# Patient Record
Sex: FEMALE | Race: WHITE | Employment: FULL TIME | URBAN - METROPOLITAN AREA
[De-identification: names, ages, dates, MRNs, and addresses within clinical notes are randomized per-mention and may not be internally consistent; named-entity substitution may affect disease eponyms.]

---

## 2017-03-29 ENCOUNTER — OFFICE VISIT (OUTPATIENT)
Dept: FAMILY MEDICINE CLINIC | Age: 57
End: 2017-03-29

## 2017-03-29 VITALS
SYSTOLIC BLOOD PRESSURE: 148 MMHG | HEIGHT: 63 IN | RESPIRATION RATE: 16 BRPM | DIASTOLIC BLOOD PRESSURE: 88 MMHG | HEART RATE: 72 BPM | WEIGHT: 182.4 LBS | BODY MASS INDEX: 32.32 KG/M2

## 2017-03-29 DIAGNOSIS — R59.9 SWOLLEN LYMPH NODES: Primary | ICD-10-CM

## 2017-03-29 DIAGNOSIS — L71.9 ROSACEA: ICD-10-CM

## 2017-03-29 DIAGNOSIS — L21.9 SEBORRHEIC DERMATITIS OF SCALP: ICD-10-CM

## 2017-03-29 PROCEDURE — 96372 THER/PROPH/DIAG INJ SC/IM: CPT | Performed by: FAMILY MEDICINE

## 2017-03-29 PROCEDURE — 99213 OFFICE O/P EST LOW 20 MIN: CPT | Performed by: FAMILY MEDICINE

## 2017-03-29 RX ORDER — METHYLPREDNISOLONE ACETATE 80 MG/ML
80 INJECTION, SUSPENSION INTRA-ARTICULAR; INTRALESIONAL; INTRAMUSCULAR; SOFT TISSUE ONCE
Status: COMPLETED | OUTPATIENT
Start: 2017-03-29 | End: 2017-03-29

## 2017-03-29 RX ORDER — METRONIDAZOLE 7.5 MG/G
GEL TOPICAL
Qty: 15 G | Refills: 5 | Status: SHIPPED | OUTPATIENT
Start: 2017-03-29 | End: 2017-09-28 | Stop reason: ALTCHOICE

## 2017-03-29 RX ORDER — METHYLPREDNISOLONE ACETATE 40 MG/ML
40 INJECTION, SUSPENSION INTRA-ARTICULAR; INTRALESIONAL; INTRAMUSCULAR; SOFT TISSUE ONCE
Status: COMPLETED | OUTPATIENT
Start: 2017-03-29 | End: 2017-03-29

## 2017-03-29 RX ORDER — BETAMETHASONE VALERATE 1.2 MG/G
AEROSOL, FOAM TOPICAL DAILY
Qty: 1 CAN | Refills: 5 | Status: SHIPPED | OUTPATIENT
Start: 2017-03-29 | End: 2017-09-28 | Stop reason: ALTCHOICE

## 2017-03-29 RX ADMIN — METHYLPREDNISOLONE ACETATE 40 MG: 40 INJECTION, SUSPENSION INTRA-ARTICULAR; INTRALESIONAL; INTRAMUSCULAR; SOFT TISSUE at 08:59

## 2017-03-29 RX ADMIN — METHYLPREDNISOLONE ACETATE 80 MG: 80 INJECTION, SUSPENSION INTRA-ARTICULAR; INTRALESIONAL; INTRAMUSCULAR; SOFT TISSUE at 08:59

## 2017-03-29 ASSESSMENT — ENCOUNTER SYMPTOMS
GASTROINTESTINAL NEGATIVE: 1
RESPIRATORY NEGATIVE: 1

## 2017-03-29 ASSESSMENT — PATIENT HEALTH QUESTIONNAIRE - PHQ9
1. LITTLE INTEREST OR PLEASURE IN DOING THINGS: 0
2. FEELING DOWN, DEPRESSED OR HOPELESS: 0
SUM OF ALL RESPONSES TO PHQ QUESTIONS 1-9: 0
SUM OF ALL RESPONSES TO PHQ9 QUESTIONS 1 & 2: 0

## 2017-04-26 ENCOUNTER — OFFICE VISIT (OUTPATIENT)
Dept: FAMILY MEDICINE CLINIC | Age: 57
End: 2017-04-26

## 2017-04-26 VITALS
BODY MASS INDEX: 31.75 KG/M2 | RESPIRATION RATE: 12 BRPM | HEART RATE: 64 BPM | SYSTOLIC BLOOD PRESSURE: 118 MMHG | HEIGHT: 63 IN | DIASTOLIC BLOOD PRESSURE: 72 MMHG | WEIGHT: 179.2 LBS

## 2017-04-26 DIAGNOSIS — L71.9 ROSACEA: ICD-10-CM

## 2017-04-26 DIAGNOSIS — L21.9 SEBORRHEIC DERMATITIS OF SCALP: Primary | ICD-10-CM

## 2017-04-26 DIAGNOSIS — K21.9 GASTROESOPHAGEAL REFLUX DISEASE, ESOPHAGITIS PRESENCE NOT SPECIFIED: ICD-10-CM

## 2017-04-26 DIAGNOSIS — R73.01 IFG (IMPAIRED FASTING GLUCOSE): ICD-10-CM

## 2017-04-26 DIAGNOSIS — Z82.49 FAMILY HISTORY OF EARLY CAD: ICD-10-CM

## 2017-04-26 DIAGNOSIS — E78.5 HYPERLIPIDEMIA, UNSPECIFIED HYPERLIPIDEMIA TYPE: ICD-10-CM

## 2017-04-26 PROCEDURE — 96372 THER/PROPH/DIAG INJ SC/IM: CPT | Performed by: FAMILY MEDICINE

## 2017-04-26 PROCEDURE — 99214 OFFICE O/P EST MOD 30 MIN: CPT | Performed by: FAMILY MEDICINE

## 2017-04-26 RX ORDER — METHYLPREDNISOLONE ACETATE 40 MG/ML
40 INJECTION, SUSPENSION INTRA-ARTICULAR; INTRALESIONAL; INTRAMUSCULAR; SOFT TISSUE ONCE
Status: COMPLETED | OUTPATIENT
Start: 2017-04-26 | End: 2017-04-26

## 2017-04-26 RX ORDER — METHYLPREDNISOLONE ACETATE 80 MG/ML
80 INJECTION, SUSPENSION INTRA-ARTICULAR; INTRALESIONAL; INTRAMUSCULAR; SOFT TISSUE ONCE
Status: COMPLETED | OUTPATIENT
Start: 2017-04-26 | End: 2017-04-26

## 2017-04-26 RX ADMIN — METHYLPREDNISOLONE ACETATE 80 MG: 80 INJECTION, SUSPENSION INTRA-ARTICULAR; INTRALESIONAL; INTRAMUSCULAR; SOFT TISSUE at 08:31

## 2017-04-26 RX ADMIN — METHYLPREDNISOLONE ACETATE 40 MG: 40 INJECTION, SUSPENSION INTRA-ARTICULAR; INTRALESIONAL; INTRAMUSCULAR; SOFT TISSUE at 08:31

## 2017-04-26 ASSESSMENT — ENCOUNTER SYMPTOMS
RESPIRATORY NEGATIVE: 1
GASTROINTESTINAL NEGATIVE: 1

## 2017-06-24 LAB
ALBUMIN SERPL-MCNC: 4.3 G/DL (ref 3.2–5.3)
ALK PHOSPHATASE: 48 IU/L (ref 35–121)
ALT SERPL-CCNC: 20 IU/L (ref 5–59)
ANION GAP SERPL CALCULATED.3IONS-SCNC: 10 MMOL/L
AST SERPL-CCNC: 17 IU/L (ref 10–42)
BILIRUB SERPL-MCNC: 0.8 MG/DL (ref 0.2–1.3)
BUN BLDV-MCNC: 20 MG/DL (ref 10–20)
CALCIUM SERPL-MCNC: 9.5 MG/DL (ref 8.7–10.8)
CHLORIDE BLD-SCNC: 105 MMOL/L (ref 95–111)
CHOLESTEROL/HDL RATIO: 3.7
CHOLESTEROL: 239 MG/DL
CO2: 28 MMOL/L (ref 21–32)
CREAT SERPL-MCNC: 0.8 MG/DL (ref 0.5–1.3)
EGFR AFRICAN AMERICAN: 89
EGFR IF NONAFRICAN AMERICAN: 74
GLUCOSE: 94 MG/DL (ref 70–100)
HDLC SERPL-MCNC: 64 MG/DL (ref 40–60)
LDL CHOLESTEROL CALCULATED: 159 MG/DL
LDL/HDL RATIO: 2.5
POTASSIUM SERPL-SCNC: 4.2 MMOL/L (ref 3.5–5.4)
SODIUM BLD-SCNC: 139 MMOL/L (ref 134–147)
TOTAL PROTEIN: 7.3 G/DL (ref 5.8–8)
TRIGL SERPL-MCNC: 82 MG/DL
VLDLC SERPL CALC-MCNC: 16 MG/DL

## 2017-06-26 LAB
AVERAGE GLUCOSE: 123 MG/DL (ref 66–114)
HBA1C MFR BLD: 5.9 % (ref 4.2–5.8)
TSH SERPL DL<=0.05 MIU/L-ACNC: 2.16 UIU/ML (ref 0.4–4.4)

## 2017-09-28 ENCOUNTER — OFFICE VISIT (OUTPATIENT)
Dept: FAMILY MEDICINE CLINIC | Age: 57
End: 2017-09-28
Payer: COMMERCIAL

## 2017-09-28 VITALS
DIASTOLIC BLOOD PRESSURE: 82 MMHG | RESPIRATION RATE: 12 BRPM | BODY MASS INDEX: 30.79 KG/M2 | WEIGHT: 173.8 LBS | HEIGHT: 63 IN | TEMPERATURE: 97.6 F | HEART RATE: 60 BPM | SYSTOLIC BLOOD PRESSURE: 136 MMHG

## 2017-09-28 DIAGNOSIS — H00.19 CHALAZION, UNSPECIFIED LATERALITY: Primary | ICD-10-CM

## 2017-09-28 PROCEDURE — 99213 OFFICE O/P EST LOW 20 MIN: CPT | Performed by: FAMILY MEDICINE

## 2017-09-28 ASSESSMENT — ENCOUNTER SYMPTOMS
GASTROINTESTINAL NEGATIVE: 1
RESPIRATORY NEGATIVE: 1

## 2018-01-05 ENCOUNTER — HOSPITAL ENCOUNTER (OUTPATIENT)
Dept: WOMENS IMAGING | Age: 58
Discharge: HOME | End: 2018-01-05
Payer: COMMERCIAL

## 2018-01-05 DIAGNOSIS — Z12.31 VISIT FOR SCREENING MAMMOGRAM: ICD-10-CM

## 2018-01-05 PROCEDURE — 77063 BREAST TOMOSYNTHESIS BI: CPT

## 2019-02-12 ENCOUNTER — HOSPITAL ENCOUNTER (OUTPATIENT)
Dept: WOMENS IMAGING | Age: 59
Discharge: HOME | End: 2019-02-12
Payer: COMMERCIAL

## 2019-02-12 DIAGNOSIS — Z12.39 SCREENING BREAST EXAMINATION: ICD-10-CM

## 2019-02-12 PROCEDURE — 77063 BREAST TOMOSYNTHESIS BI: CPT

## 2019-06-18 ENCOUNTER — OFFICE VISIT (OUTPATIENT)
Dept: FAMILY MEDICINE CLINIC | Age: 59
End: 2019-06-18
Payer: COMMERCIAL

## 2019-06-18 VITALS
HEART RATE: 68 BPM | TEMPERATURE: 97.6 F | RESPIRATION RATE: 13 BRPM | SYSTOLIC BLOOD PRESSURE: 122 MMHG | DIASTOLIC BLOOD PRESSURE: 78 MMHG | HEIGHT: 63 IN | BODY MASS INDEX: 31.29 KG/M2 | WEIGHT: 176.6 LBS | OXYGEN SATURATION: 98 %

## 2019-06-18 DIAGNOSIS — Z91.09 ENVIRONMENTAL ALLERGIES: ICD-10-CM

## 2019-06-18 DIAGNOSIS — J34.2 DNS (DEVIATED NASAL SEPTUM): ICD-10-CM

## 2019-06-18 DIAGNOSIS — H69.83 ETD (EUSTACHIAN TUBE DYSFUNCTION), BILATERAL: Primary | ICD-10-CM

## 2019-06-18 PROCEDURE — 96372 THER/PROPH/DIAG INJ SC/IM: CPT | Performed by: FAMILY MEDICINE

## 2019-06-18 PROCEDURE — 99213 OFFICE O/P EST LOW 20 MIN: CPT | Performed by: FAMILY MEDICINE

## 2019-06-18 RX ORDER — METHYLPREDNISOLONE ACETATE 40 MG/ML
40 INJECTION, SUSPENSION INTRA-ARTICULAR; INTRALESIONAL; INTRAMUSCULAR; SOFT TISSUE ONCE
Status: COMPLETED | OUTPATIENT
Start: 2019-06-18 | End: 2019-06-18

## 2019-06-18 RX ORDER — METHYLPREDNISOLONE ACETATE 80 MG/ML
80 INJECTION, SUSPENSION INTRA-ARTICULAR; INTRALESIONAL; INTRAMUSCULAR; SOFT TISSUE ONCE
Status: COMPLETED | OUTPATIENT
Start: 2019-06-18 | End: 2019-06-18

## 2019-06-18 RX ADMIN — METHYLPREDNISOLONE ACETATE 80 MG: 80 INJECTION, SUSPENSION INTRA-ARTICULAR; INTRALESIONAL; INTRAMUSCULAR; SOFT TISSUE at 08:46

## 2019-06-18 RX ADMIN — METHYLPREDNISOLONE ACETATE 40 MG: 40 INJECTION, SUSPENSION INTRA-ARTICULAR; INTRALESIONAL; INTRAMUSCULAR; SOFT TISSUE at 08:45

## 2019-06-18 ASSESSMENT — PATIENT HEALTH QUESTIONNAIRE - PHQ9
SUM OF ALL RESPONSES TO PHQ9 QUESTIONS 1 & 2: 0
2. FEELING DOWN, DEPRESSED OR HOPELESS: 0
1. LITTLE INTEREST OR PLEASURE IN DOING THINGS: 0
SUM OF ALL RESPONSES TO PHQ QUESTIONS 1-9: 0
SUM OF ALL RESPONSES TO PHQ QUESTIONS 1-9: 0

## 2019-06-18 ASSESSMENT — ENCOUNTER SYMPTOMS
GASTROINTESTINAL NEGATIVE: 1
RESPIRATORY NEGATIVE: 1
SINUS PRESSURE: 1

## 2019-06-18 NOTE — PATIENT INSTRUCTIONS
You may receive a survey regarding the care you received during your visit. Your input is valuable to us. We encourage you to complete and return your survey. We hope you will choose us in the future for your healthcare needs. Patient Education        Eustachian Tube Problems: Care Instructions  Your Care Instructions    The eustachian (say \"you-STAY-shee-un\") tubes run between the inside of the ears and the throat. They keep air pressure stable in the ears. If your eustachian tubes become blocked, the air pressure in your ears changes. The fluids from a cold can clog eustachian tubes, causing pain in the ears. A quick change in air pressure can cause eustachian tubes to close up. This might happen when an airplane changes altitude or when a  goes up or down underwater. Eustachian tube problems often clear up on their own or after antibiotic treatment. If your tubes continue to be blocked, you may need surgery. Follow-up care is a key part of your treatment and safety. Be sure to make and go to all appointments, and call your doctor if you are having problems. It's also a good idea to know your test results and keep a list of the medicines you take. How can you care for yourself at home? · To ease ear pain, apply a warm washcloth or a heating pad set on low. There may be some drainage from the ear when the heat melts earwax. Put a cloth between the heat source and your skin. Do not use a heating pad with children. · If your doctor prescribed antibiotics, take them as directed. Do not stop taking them just because you feel better. You need to take the full course of antibiotics. · Your doctor may recommend over-the-counter medicine. Be safe with medicines. Oral or nasal decongestants may relieve ear pain. Avoid decongestants that are combined with antihistamines, which tend to cause more blockage. But if allergies seem to be the problem, your doctor may recommend a combination.  Be careful with cough and cold medicines. Don't give them to children younger than 6, because they don't work for children that age and can even be harmful. For children 6 and older, always follow all the instructions carefully. Make sure you know how much medicine to give and how long to use it. And use the dosing device if one is included. When should you call for help? Call your doctor now or seek immediate medical care if:    · You develop sudden, complete hearing loss.     · You have severe pain or feel dizzy.     · You have new or increasing pus or blood draining from your ear.     · You have redness, swelling, or pain around or behind the ear.    Watch closely for changes in your health, and be sure to contact your doctor if:    · You do not get better after 2 weeks.     · You have any new symptoms, such as itching or a feeling of fullness in the ear. Where can you learn more? Go to https://[employer]ome.Nubisio. org and sign in to your [employer] account. Enter Y822 in the [employer] box to learn more about \"Eustachian Tube Problems: Care Instructions. \"     If you do not have an account, please click on the \"Sign Up Now\" link. Current as of: October 21, 2018  Content Version: 12.0  © 5973-9645 Healthwise, Incorporated. Care instructions adapted under license by [facility] ([facility]). If you have questions about a medical condition or this instruction, always ask your healthcare professional. Brissa Dollar any warranty or liability for your use of this information.        OTC Allegra-D 24 hr and Nasacort NS

## 2019-06-18 NOTE — PROGRESS NOTES
Visit Information    Have you changed or started any medications since your last visit including any over-the-counter medicines, vitamins, or herbal medicines? no   Are you having any side effects from any of your medications? -  no  Have you stopped taking any of your medications? Is so, why? -  no    Have you seen any other physician or provider since your last visit? No  Have you had any other diagnostic tests since your last visit? No  Have you been seen in the emergency room and/or had an admission to a hospital since we last saw you? No  Have you had your routine dental cleaning in the past 6 months? na    Have you activated your [employer] account? If not, what are your barriers? No: declined      Patient Care Team:  Valerie Mishra DO as PCP - General (Family Medicine)  Valerie Mishra DO as PCP - [facility]    Medical History Review  Past Medical, Family, and Social History reviewed and does not contribute to the patient presenting condition    Health Maintenance   Topic Date Due    Hepatitis C screen  1960    HIV screen  12/21/1973    DTaP/Tdap/Td vaccine (1 - Tdap) 12/21/1977    Shingles Vaccine (1 of 2) 12/21/2008    A1C test (Diabetic or Prediabetic)  06/24/2018    Flu vaccine (Season Ended) 09/01/2019    Breast cancer screen  02/12/2021    Lipid screen  06/24/2022    Colon cancer screen colonoscopy  11/13/2027    Pneumococcal 0-64 years Vaccine  Aged Out       Administrations This Visit     methylPREDNISolone acetate (DEPO-MEDROL) injection 40 mg     Admin Date  06/18/2019  08:45 Action  Given Dose  40 mg Route  Intramuscular Site  Dorsogluteal Right Administered By  Paolo Quinones CMA ([address] [place] [address])    Ordering Provider:  Valerie Mishra DO    NDC:  4733-9418-70    Lot#:  [id number]    :  [id number].     Patient Supplied?:  No    Comments:  Ex date: 9/19          methylPREDNISolone acetate (DEPO-MEDROL) injection 80 mg     Admin Date  06/18/2019  08:46 Action  Given Dose  80 mg Route  Intramuscular Site  Dorsogluteal Right Administered By  Bernabe Bergeron CMA ([address] [place] [address])    Ordering Provider:  Shira Mancuso DO    NDC:  0815-9629-09    Lot#:  [id number]    :  [id number]. Patient Supplied?:  No    Comments:  Ex date: 6/20              Per orders of Dr. Fede Devi, injection of Depo-Medrol 120 mg 2 ML given IM in right dorsogluteal by Can Hirsch ([address] [place] [address]). Patient instructed to report any adverse reaction to me immediately. .  Patient tolerated well.

## 2019-06-18 NOTE — PROGRESS NOTES
Subjective:      Patient ID: Karime Guzman is a 59 y.o. female. HPI:    Chief Complaint   Patient presents with    Ear Fullness     \"hollow\" sounds in ears     Sinus Problem     pressure, nasal congestion tried OTC Advil cold/sinus      Pt here for sinus pressure, nasal congestion and ear fullness for the last few months. No fevers. Taking Advil Cold and Sinus. No allergy meds. Patient Active Problem List   Diagnosis    GERD (gastroesophageal reflux disease)    History of hysterectomy for benign disease    Family history of early CAD    Overactive bladder     Past Surgical History:   Procedure Laterality Date    BLADDER SUSPENSION  2000    sling- Dr Fontanez [facility]  ?????    left and right    HYSTERECTOMY      KNEE ARTHROSCOPY  2009    knee cap    ROTATOR CUFF REPAIR Left 2001     Prior to Admission medications    Medication Sig Start Date End Date Taking? Authorizing Provider   omeprazole (PRILOSEC OTC) 20 MG tablet Take 20 mg by mouth daily. Yes Historical Provider, MD   Ascorbic Acid (VITAMIN C PO) Take 500 mg by mouth daily. Yes Historical Provider, MD       Review of Systems   Constitutional: Negative. HENT: Positive for congestion, hearing loss and sinus pressure. Negative for ear discharge, ear pain and tinnitus. Respiratory: Negative. Cardiovascular: Negative. Gastrointestinal: Negative. Musculoskeletal: Negative. Neurological: Positive for headaches. All other systems reviewed and are negative. Objective:   Physical Exam   Constitutional: She is oriented to person, place, and time. She appears well-developed and well-nourished. HENT:   Head: Normocephalic and atraumatic. Right Ear: Tympanic membrane is scarred. A middle ear effusion is present. Left Ear: A middle ear effusion is present. Nose: Mucosal edema, rhinorrhea and septal deviation present.    Mouth/Throat: Oropharynx is clear and moist and mucous membranes are normal. Cardiovascular: Normal rate, regular rhythm and normal heart sounds. No murmur heard. Pulmonary/Chest: Effort normal and breath sounds normal.   Abdominal: Soft. Bowel sounds are normal.   Musculoskeletal: She exhibits no edema. Neurological: She is alert and oriented to person, place, and time. Skin: Skin is warm and dry. Psychiatric: She has a normal mood and affect. Her behavior is normal.   Nursing note and vitals reviewed. Assessment:       Diagnosis Orders   1. ETD (Eustachian tube dysfunction), bilateral     2. Environmental allergies  methylPREDNISolone acetate (DEPO-MEDROL) injection 80 mg    methylPREDNISolone acetate (DEPO-MEDROL) injection 40 mg   3.  DNS (deviated nasal septum)             Plan:      -  Depo 120 mg IM  -  OTC Allegra-D, Nasacort  -  Discussed ENT eval, declines at this time  -  RTO prn        Dwaine Cancer, DO

## 2019-08-21 ENCOUNTER — OFFICE VISIT (OUTPATIENT)
Dept: FAMILY MEDICINE CLINIC | Age: 59
End: 2019-08-21
Payer: COMMERCIAL

## 2019-08-21 VITALS
HEART RATE: 76 BPM | BODY MASS INDEX: 31.9 KG/M2 | WEIGHT: 180.1 LBS | RESPIRATION RATE: 16 BRPM | SYSTOLIC BLOOD PRESSURE: 130 MMHG | DIASTOLIC BLOOD PRESSURE: 70 MMHG

## 2019-08-21 DIAGNOSIS — Z91.09 ENVIRONMENTAL ALLERGIES: ICD-10-CM

## 2019-08-21 DIAGNOSIS — H69.83 ETD (EUSTACHIAN TUBE DYSFUNCTION), BILATERAL: Primary | ICD-10-CM

## 2019-08-21 DIAGNOSIS — J34.2 DNS (DEVIATED NASAL SEPTUM): ICD-10-CM

## 2019-08-21 PROCEDURE — 99213 OFFICE O/P EST LOW 20 MIN: CPT | Performed by: FAMILY MEDICINE

## 2019-08-21 ASSESSMENT — ENCOUNTER SYMPTOMS
RESPIRATORY NEGATIVE: 1
GASTROINTESTINAL NEGATIVE: 1

## 2019-08-21 NOTE — PROGRESS NOTES
Visit Information    Have you changed or started any medications since your last visit including any over-the-counter medicines, vitamins, or herbal medicines? no   Are you having any side effects from any of your medications? -  no  Have you stopped taking any of your medications? Is so, why? -  no    Have you seen any other physician or provider since your last visit? No  Have you had any other diagnostic tests since your last visit? No  Have you been seen in the emergency room and/or had an admission to a hospital since we last saw you? No  Have you had your routine dental cleaning in the past 6 months? yes -      Have you activated your In[employer] account? If not, what are your barriers?  No: declines     Patient Care Team:  Nuzhat Max DO as PCP - General (Family Medicine)  Nuzhat Max DO as PCP - [facility] Provider    Medical History Review  Past Medical, Family, and Social History reviewed and does contribute to the patient presenting condition    Health Maintenance   Topic Date Due    Hepatitis C screen  1960    HIV screen  12/21/1973    DTaP/Tdap/Td vaccine (1 - Tdap) 12/21/1977    Shingles Vaccine (1 of 2) 12/21/2008    A1C test (Diabetic or Prediabetic)  06/24/2018    Flu vaccine (1) 09/01/2019    Breast cancer screen  02/12/2021    Lipid screen  06/24/2022    Colon cancer screen colonoscopy  11/13/2027    Pneumococcal 0-64 years Vaccine  Aged Out

## 2019-09-09 ENCOUNTER — HOSPITAL ENCOUNTER (OUTPATIENT)
Dept: CT IMAGING | Age: 59
Discharge: HOME | End: 2019-09-09
Payer: COMMERCIAL

## 2019-09-09 DIAGNOSIS — J34.2 DNS (DEVIATED NASAL SEPTUM): ICD-10-CM

## 2019-09-09 DIAGNOSIS — H69.83 ETD (EUSTACHIAN TUBE DYSFUNCTION), BILATERAL: ICD-10-CM

## 2019-09-09 PROCEDURE — 70486 CT MAXILLOFACIAL W/O DYE: CPT

## 2019-09-10 DIAGNOSIS — J34.2 DNS (DEVIATED NASAL SEPTUM): ICD-10-CM

## 2019-09-10 DIAGNOSIS — H69.83 ETD (EUSTACHIAN TUBE DYSFUNCTION), BILATERAL: Primary | ICD-10-CM

## 2019-10-18 ENCOUNTER — HOSPITAL ENCOUNTER (OUTPATIENT)
Age: 59
Discharge: HOME | End: 2019-10-18
Payer: COMMERCIAL

## 2019-10-18 PROCEDURE — 36415 COLL VENOUS BLD VENIPUNCTURE: CPT

## 2019-10-18 PROCEDURE — 86003 ALLG SPEC IGE CRUDE XTRC EA: CPT

## 2019-10-18 PROCEDURE — 86235 NUCLEAR ANTIGEN ANTIBODY: CPT

## 2019-10-20 LAB
ANTI SSA: NORMAL
ANTI SSB: 0 AU/ML (ref 0–40)

## 2019-10-22 LAB
ALLERGEN BARLEY IGE: < 0.1 KU/L
ALLERGEN BEEF: < 0.1 KU/L
ALLERGEN BERMUDA GRASS IGE: < 0.1 KU/L
ALLERGEN BIRCH IGE: < 0.1 KU/L
ALLERGEN BOX ELDER: < 0.1 KU/L
ALLERGEN CABBAGE IGE: < 0.1 KU/L
ALLERGEN CARROT IGE: < 0.1 KU/L
ALLERGEN CAT DANDER IGE: < 0.1 KU/L
ALLERGEN CHICKEN IGE: < 0.1 KU/L
ALLERGEN CODFISH IGE: < 0.1 KU/L
ALLERGEN COMMON SHORT RAGWEED IGE: < 0.1 KU/L
ALLERGEN CORN IGE: < 0.1 KU/L
ALLERGEN COTTONWOOD: < 0.1 KU/L
ALLERGEN CRAB IGE: < 0.1 KU/L
ALLERGEN D. FARINAE (DUST MITE): < 0.1 KU/L
ALLERGEN DOG DANDER IGE: < 0.1 KU/L
ALLERGEN EGG WHITE IGE: < 0.1 KU/L
ALLERGEN ELM IGE: < 0.1 KU/L
ALLERGEN FUNGI/MOLD A. ALTERNATA IGE: < 0.1 KU/L
ALLERGEN FUNGI/MOLD A. FUMIGATUS IGE: < 0.1 KU/L
ALLERGEN FUNGI/MOLD M.RACEMOSUS IGE: < 0.1 KU/L
ALLERGEN FUNGI/MOLD P. NOTATUM IGE: < 0.1 KU/L
ALLERGEN GERMAN COCKROACH IGE: < 0.1 KU/L
ALLERGEN GRAPE IGE: < 0.1 KU/L
ALLERGEN INTERPRETATION/SCORE: NORMAL
ALLERGEN LETTUCE IGE: < 0.1 KU/L
ALLERGEN MILK IGE: < 0.1 KU/L
ALLERGEN MITE DUST PTERONYSSINUS IGE: < 0.1 KU/L
ALLERGEN MOUNTAIN CEDAR: < 0.1 KU/L
ALLERGEN MOUSE EPITHELIA IGE: < 0.1 KU/L
ALLERGEN NAVY BEAN: < 0.1 KU/L
ALLERGEN OAT: < 0.1 KU/L
ALLERGEN ORANGE IGE: < 0.1 KU/L
ALLERGEN PECAN TREE IGE: < 0.1 KU/L
ALLERGEN PEPPER C. ANNUUM IGE: < 0.1 KU/L
ALLERGEN PORK: < 0.1 KU/L
ALLERGEN POTATO IGE: < 0.1 KU/L
ALLERGEN RICE IGE: < 0.1 KU/L
ALLERGEN RUSSIAN THISTLE IGE: < 0.1 KU/L
ALLERGEN RYE IGE: < 0.1 KU/L
ALLERGEN SHEEP SORREL (W18) IGE: < 0.1 KU/L
ALLERGEN SHRIMP IGE: < 0.1 KU/L
ALLERGEN SOYBEAN IGE: < 0.1 KU/L
ALLERGEN TIMOTHY GRASS: < 0.1 KU/L
ALLERGEN TOMATO IGE: < 0.1 KU/L
ALLERGEN TREE SYCAMORE: < 0.1 KU/L
ALLERGEN TUNA IGE: < 0.1 KU/L
ALLERGEN WALNUT TREE IGE: < 0.1 KU/L
ALLERGEN WEED, PIGWEED IGE: < 0.1 KU/L
ALLERGEN WHEAT IGE: < 0.1 KU/L
ALLERGEN WHITE ASH: < 0.1 KU/L
ALLERGEN WHITE MULBERRY TREE, IGE: < 0.1 KU/L
ALLERGEN, FUNGI/MOLD: < 0.1 KU/L
ALLERGEN, TREE, OAK: < 0.1 KU/L
IMMUNOGLOBULIN E: 29 KU/L

## 2020-01-02 ENCOUNTER — OFFICE VISIT (OUTPATIENT)
Dept: FAMILY MEDICINE CLINIC | Age: 60
End: 2020-01-02
Payer: COMMERCIAL

## 2020-01-02 VITALS
HEIGHT: 63 IN | WEIGHT: 189.5 LBS | HEART RATE: 72 BPM | SYSTOLIC BLOOD PRESSURE: 150 MMHG | DIASTOLIC BLOOD PRESSURE: 82 MMHG | BODY MASS INDEX: 33.58 KG/M2 | RESPIRATION RATE: 16 BRPM

## 2020-01-02 PROCEDURE — 99243 OFF/OP CNSLTJ NEW/EST LOW 30: CPT | Performed by: FAMILY MEDICINE

## 2020-01-02 SDOH — ECONOMIC STABILITY: TRANSPORTATION INSECURITY
IN THE PAST 12 MONTHS, HAS THE LACK OF TRANSPORTATION KEPT YOU FROM MEDICAL APPOINTMENTS OR FROM GETTING MEDICATIONS?: NO

## 2020-01-02 SDOH — ECONOMIC STABILITY: FOOD INSECURITY: WITHIN THE PAST 12 MONTHS, YOU WORRIED THAT YOUR FOOD WOULD RUN OUT BEFORE YOU GOT MONEY TO BUY MORE.: NEVER TRUE

## 2020-01-02 SDOH — ECONOMIC STABILITY: TRANSPORTATION INSECURITY
IN THE PAST 12 MONTHS, HAS LACK OF TRANSPORTATION KEPT YOU FROM MEETINGS, WORK, OR FROM GETTING THINGS NEEDED FOR DAILY LIVING?: NO

## 2020-01-02 SDOH — ECONOMIC STABILITY: INCOME INSECURITY: HOW HARD IS IT FOR YOU TO PAY FOR THE VERY BASICS LIKE FOOD, HOUSING, MEDICAL CARE, AND HEATING?: NOT HARD AT ALL

## 2020-01-02 SDOH — ECONOMIC STABILITY: FOOD INSECURITY: WITHIN THE PAST 12 MONTHS, THE FOOD YOU BOUGHT JUST DIDN'T LAST AND YOU DIDN'T HAVE MONEY TO GET MORE.: NEVER TRUE

## 2020-01-02 ASSESSMENT — PATIENT HEALTH QUESTIONNAIRE - PHQ9
2. FEELING DOWN, DEPRESSED OR HOPELESS: 0
SUM OF ALL RESPONSES TO PHQ QUESTIONS 1-9: 0
1. LITTLE INTEREST OR PLEASURE IN DOING THINGS: 0
SUM OF ALL RESPONSES TO PHQ9 QUESTIONS 1 & 2: 0
SUM OF ALL RESPONSES TO PHQ QUESTIONS 1-9: 0

## 2020-01-02 ASSESSMENT — ENCOUNTER SYMPTOMS
RESPIRATORY NEGATIVE: 1
GASTROINTESTINAL NEGATIVE: 1

## 2020-01-02 NOTE — PROGRESS NOTES
Subjective:      Patient ID: Kathie Hatch is a 62 y.o. female. HPI:    Chief Complaint   Patient presents with    Pre-op Exam     sinus surgery Dr. Aubrie Zamorano 1/13/2020    Hypertension     pt says her bp has been elevated lately     Pre-op eval.  Pt is scheduled for sinus surgery with Dr. Aubrie Zamorano on 1/13/20. Scheduled for full sinus surgery at that time. Pt denies CP, chest tightness, SOB. Able to perform 4 METs with no cardiac symptoms. BP has been running high lately. No hx of HTN. No home checks. BP Readings from Last 3 Encounters:   01/02/20 (!) 150/82   08/21/19 130/70   06/18/19 122/78     Weight is up. Has not been eating good over the holidays. Wt Readings from Last 3 Encounters:   01/02/20 189 lb 8 oz (86 kg)   08/21/19 180 lb 1.6 oz (81.7 kg)   06/18/19 176 lb 9.6 oz (80.1 kg)     Patient Active Problem List   Diagnosis    GERD (gastroesophageal reflux disease)    History of hysterectomy for benign disease    Family history of early CAD    Overactive bladder     Past Surgical History:   Procedure Laterality Date    BLADDER SUSPENSION  2000    sling- Dr Maldonado Self  ?????    left and right    HYSTERECTOMY      KNEE ARTHROSCOPY  2009    knee cap    ROTATOR CUFF REPAIR Left 2001     Prior to Admission medications    Medication Sig Start Date End Date Taking? Authorizing Provider   Mometasone Furoate (NASONEX NA) by Nasal route   Yes Historical Provider, MD   omeprazole (PRILOSEC OTC) 20 MG tablet Take 20 mg by mouth daily. Yes Historical Provider, MD   Ascorbic Acid (VITAMIN C PO) Take 500 mg by mouth daily. Yes Historical Provider, MD         Review of Systems   Constitutional: Negative. HENT: Negative. Respiratory: Negative. Cardiovascular: Negative. Gastrointestinal: Negative. Musculoskeletal: Negative. All other systems reviewed and are negative. Objective:   Physical Exam  Vitals signs and nursing note reviewed.    Constitutional:

## 2020-02-20 ENCOUNTER — HOSPITAL ENCOUNTER (OUTPATIENT)
Dept: WOMENS IMAGING | Age: 60
Discharge: HOME | End: 2020-02-20
Payer: COMMERCIAL

## 2020-02-20 PROCEDURE — 77063 BREAST TOMOSYNTHESIS BI: CPT

## 2021-04-23 ENCOUNTER — HOSPITAL ENCOUNTER (OUTPATIENT)
Dept: WOMENS IMAGING | Age: 61
Discharge: HOME | End: 2021-04-23
Payer: COMMERCIAL

## 2021-04-23 DIAGNOSIS — Z12.31 VISIT FOR SCREENING MAMMOGRAM: ICD-10-CM

## 2021-04-23 PROCEDURE — 77063 BREAST TOMOSYNTHESIS BI: CPT

## 2022-07-01 ENCOUNTER — HOSPITAL ENCOUNTER (OUTPATIENT)
Dept: WOMENS IMAGING | Age: 62
Discharge: HOME | End: 2022-07-01
Payer: COMMERCIAL

## 2022-07-01 DIAGNOSIS — Z12.31 VISIT FOR SCREENING MAMMOGRAM: ICD-10-CM

## 2022-07-01 PROCEDURE — 77063 BREAST TOMOSYNTHESIS BI: CPT

## 2024-01-15 ENCOUNTER — HOSPITAL ENCOUNTER (OUTPATIENT)
Dept: WOMENS IMAGING | Age: 64
Discharge: HOME | End: 2024-01-15
Payer: COMMERCIAL

## 2024-01-15 VITALS — BODY MASS INDEX: 31.89 KG/M2 | WEIGHT: 180 LBS | HEIGHT: 63 IN

## 2024-01-15 DIAGNOSIS — Z12.31 VISIT FOR SCREENING MAMMOGRAM: ICD-10-CM

## 2024-01-15 PROCEDURE — 77063 BREAST TOMOSYNTHESIS BI: CPT

## 2024-02-16 ENCOUNTER — TELEPHONE (OUTPATIENT)
Dept: FAMILY MEDICINE CLINIC | Age: 64
End: 2024-02-16

## 2024-02-16 NOTE — TELEPHONE ENCOUNTER
Patient called and wanted to schedule an appointment today as her ear is bothering her.   Informed patient that we didn't have anything opened today but we could look into next week.   Patient stated that she may go to urgent care.     Looking at patient chart last appointment was 1/2/2020. Advised patient that we would recommend an appointment since we haven't seen her in a while. She stated that she will call back to schedule.

## 2024-02-22 ENCOUNTER — OFFICE VISIT (OUTPATIENT)
Dept: FAMILY MEDICINE CLINIC | Age: 64
End: 2024-02-22

## 2024-02-22 VITALS
RESPIRATION RATE: 16 BRPM | HEIGHT: 63 IN | HEART RATE: 88 BPM | WEIGHT: 184 LBS | DIASTOLIC BLOOD PRESSURE: 70 MMHG | BODY MASS INDEX: 32.6 KG/M2 | SYSTOLIC BLOOD PRESSURE: 136 MMHG

## 2024-02-22 DIAGNOSIS — Z00.00 WELL ADULT EXAM: Primary | ICD-10-CM

## 2024-02-22 DIAGNOSIS — J06.9 VIRAL URI: ICD-10-CM

## 2024-02-22 PROBLEM — D18.01 HEMANGIOMA OF SKIN AND SUBCUTANEOUS TISSUE: Status: ACTIVE | Noted: 2022-09-26

## 2024-02-22 PROBLEM — J35.1 HYPERTROPHY OF TONSILS: Status: ACTIVE | Noted: 2024-02-22

## 2024-02-22 PROBLEM — R22.0 MASS OF POSTAURICULAR AREA: Status: ACTIVE | Noted: 2024-02-22

## 2024-02-22 SDOH — ECONOMIC STABILITY: INCOME INSECURITY: HOW HARD IS IT FOR YOU TO PAY FOR THE VERY BASICS LIKE FOOD, HOUSING, MEDICAL CARE, AND HEATING?: NOT HARD AT ALL

## 2024-02-22 SDOH — ECONOMIC STABILITY: FOOD INSECURITY: WITHIN THE PAST 12 MONTHS, YOU WORRIED THAT YOUR FOOD WOULD RUN OUT BEFORE YOU GOT MONEY TO BUY MORE.: NEVER TRUE

## 2024-02-22 SDOH — ECONOMIC STABILITY: FOOD INSECURITY: WITHIN THE PAST 12 MONTHS, THE FOOD YOU BOUGHT JUST DIDN'T LAST AND YOU DIDN'T HAVE MONEY TO GET MORE.: NEVER TRUE

## 2024-02-22 SDOH — ECONOMIC STABILITY: HOUSING INSECURITY
IN THE LAST 12 MONTHS, WAS THERE A TIME WHEN YOU DID NOT HAVE A STEADY PLACE TO SLEEP OR SLEPT IN A SHELTER (INCLUDING NOW)?: NO

## 2024-02-22 ASSESSMENT — ENCOUNTER SYMPTOMS
SHORTNESS OF BREATH: 0
COUGH: 0
NAUSEA: 0
VOICE CHANGE: 1
RHINORRHEA: 1
ABDOMINAL PAIN: 0
SORE THROAT: 0

## 2024-02-22 ASSESSMENT — PATIENT HEALTH QUESTIONNAIRE - PHQ9
1. LITTLE INTEREST OR PLEASURE IN DOING THINGS: 0
SUM OF ALL RESPONSES TO PHQ QUESTIONS 1-9: 0
SUM OF ALL RESPONSES TO PHQ9 QUESTIONS 1 & 2: 0
SUM OF ALL RESPONSES TO PHQ QUESTIONS 1-9: 0
2. FEELING DOWN, DEPRESSED OR HOPELESS: 0

## 2024-02-22 NOTE — PROGRESS NOTES
Subjective:      Patient ID: Latesha Strickland 1960 is a 63 y.o. female here for evaluation.     Chief Complaint   Patient presents with    Hoarse    Otalgia     Left     Onset of 1 week with HA, body aches and runny nose.   Sinus congestion.  Runny nose.  Hoarse voice.  PND.  Symptoms are improving and better today.  Flew back from [place] prior to presentation.    Chronic sinus hx with sinus surgery in 2020.  Denies regular sinus medication.     Patient Active Problem List   Diagnosis    GERD (gastroesophageal reflux disease)    History of hysterectomy for benign disease    Family history of early CAD    Overactive bladder    Mass of postauricular area    Hypertrophy of tonsils    Hemangioma of skin and subcutaneous tissue     COLONOSCOPY - 2023  [facility] - 2024    Denies CP, SOB or chest tightness.  Active.  Still working. Feels well.     Vitals:    02/22/24 0748   BP: 136/70   Pulse: 88   Resp: 16      BP wnl.     BP Readings from Last 3 Encounters:   02/22/24 136/70   01/02/20 (!) 150/82   08/21/19 130/70       Dur for screening labs    Lab Results   Component Value Date    LABA1C 6.5 (H) 04/02/2022    LABA1C 5.9 (H) 06/24/2017     Lab Results   Component Value Date     04/02/2022       No components found for: \"CHLPL\"  Lab Results   Component Value Date    TRIG 112 04/02/2022    TRIG 82 06/24/2017    TRIG 104 08/20/2012     Lab Results   Component Value Date    HDL 47 04/02/2022    HDL 64 (H) 06/24/2017    HDL 52 08/20/2012     Lab Results   Component Value Date    LDLCALC 134 (H) 04/02/2022    LDLCALC 159 (H) 06/24/2017    LDLCALC 155 08/20/2012     No components found for: \"LABVLDL\"      Chemistry        Component Value Date/Time     04/02/2022 0630    K 4.4 04/02/2022 0630     04/02/2022 0630    CO2 28 04/02/2022 0630    BUN 16 04/02/2022 0630    CREATININE 0.93 04/02/2022 0630        Component Value Date/Time    CALCIUM 9.20 04/02/2022 0630    ALKPHOS 52 04/02/2022 0630    AST 17

## 2024-02-23 LAB
ABSOLUTE BASO #: 0.04 K/UL (ref 0–0.2)
ABSOLUTE EOS #: 0.31 K/UL (ref 0–0.5)
ABSOLUTE LYMPH #: 1.95 K/UL (ref 1–4)
ABSOLUTE MONO #: 0.63 K/UL (ref 0.2–1)
ABSOLUTE NEUT #: 4.07 K/UL (ref 1.5–7.5)
ALBUMIN SERPL-MCNC: 4.6 G/DL (ref 3.5–5.2)
ALK PHOSPHATASE: 76 U/L (ref 40–140)
ALT SERPL-CCNC: 23 U/L (ref 5–40)
ANION GAP SERPL CALCULATED.3IONS-SCNC: 12 MEQ/L (ref 7–16)
AST SERPL-CCNC: 16 U/L (ref 9–40)
BASOPHILS RELATIVE PERCENT: 0.6 %
BILIRUB SERPL-MCNC: 0.6 MG/DL
BUN BLDV-MCNC: 16 MG/DL (ref 8–23)
CALCIUM SERPL-MCNC: 9.5 MG/DL (ref 8.5–10.5)
CHLORIDE BLD-SCNC: 101 MEQ/L (ref 95–107)
CHOLESTEROL/HDL RATIO: 3.8 RATIO
CHOLESTEROL: 177 MG/DL
CO2: 27 MEQ/L (ref 19–31)
CREAT SERPL-MCNC: 0.92 MG/DL (ref 0.6–1.3)
EGFR IF NONAFRICAN AMERICAN: 69 ML/MIN/1.73
EOSINOPHILS RELATIVE PERCENT: 4.4 %
ESTIMATED AVERAGE GLUCOSE: 137 MG/DL
GLUCOSE: 106 MG/DL (ref 70–99)
HBA1C MFR BLD: 6.4 % (ref 4.2–5.6)
HCT VFR BLD CALC: 36.7 % (ref 34–45)
HDLC SERPL-MCNC: 47 MG/DL
HEMOGLOBIN: 12.3 G/DL (ref 11.5–15.5)
LDL CHOLESTEROL CALCULATED: 116 MG/DL
LDL/HDL RATIO: 2.5 RATIO
LYMPHOCYTE %: 27.8 %
MCH RBC QN AUTO: 28.7 PG (ref 25–33)
MCHC RBC AUTO-ENTMCNC: 33.5 G/DL (ref 31–36)
MCV RBC AUTO: 85.7 FL (ref 80–99)
MONOCYTES # BLD: 9 %
NEUTROPHILS RELATIVE PERCENT: 57.9 %
PDW BLD-RTO: 12.6 % (ref 11.5–15)
PLATELETS: 342 K/UL (ref 130–400)
PMV BLD AUTO: 9.9 FL (ref 9.3–13)
POTASSIUM SERPL-SCNC: 4.8 MEQ/L (ref 3.5–5.4)
RBC: 4.28 M/UL (ref 3.8–5.4)
SODIUM BLD-SCNC: 140 MEQ/L (ref 133–146)
TOTAL PROTEIN: 7.5 G/DL (ref 6.1–8.3)
TRIGL SERPL-MCNC: 71 MG/DL
TSH SERPL DL<=0.05 MIU/L-ACNC: 2.34 UIU/ML (ref 0.4–4.1)
VLDLC SERPL CALC-MCNC: 14 MG/DL
WBC: 7 K/UL (ref 3.5–11)

## 2025-01-30 ENCOUNTER — OFFICE VISIT (OUTPATIENT)
Dept: CARDIOLOGY CLINIC | Age: 65
End: 2025-01-30
Payer: COMMERCIAL

## 2025-01-30 VITALS
WEIGHT: 187 LBS | SYSTOLIC BLOOD PRESSURE: 192 MMHG | HEIGHT: 63 IN | DIASTOLIC BLOOD PRESSURE: 102 MMHG | HEART RATE: 68 BPM | BODY MASS INDEX: 33.13 KG/M2

## 2025-01-30 DIAGNOSIS — Z82.49 FAMILY HISTORY OF HEART DISEASE: Primary | ICD-10-CM

## 2025-01-30 DIAGNOSIS — I10 PRIMARY HYPERTENSION: ICD-10-CM

## 2025-01-30 PROCEDURE — 3077F SYST BP >= 140 MM HG: CPT | Performed by: NUCLEAR MEDICINE

## 2025-01-30 PROCEDURE — 1123F ACP DISCUSS/DSCN MKR DOCD: CPT | Performed by: NUCLEAR MEDICINE

## 2025-01-30 PROCEDURE — 3080F DIAST BP >= 90 MM HG: CPT | Performed by: NUCLEAR MEDICINE

## 2025-01-30 PROCEDURE — 93000 ELECTROCARDIOGRAM COMPLETE: CPT | Performed by: NUCLEAR MEDICINE

## 2025-01-30 PROCEDURE — 99204 OFFICE O/P NEW MOD 45 MIN: CPT | Performed by: NUCLEAR MEDICINE

## 2025-01-30 ASSESSMENT — ENCOUNTER SYMPTOMS
SHORTNESS OF BREATH: 1
COLOR CHANGE: 0
VOMITING: 0
DIARRHEA: 0
NAUSEA: 0
ABDOMINAL PAIN: 0
CHEST TIGHTNESS: 0
BLOOD IN STOOL: 0
RECTAL PAIN: 0
ANAL BLEEDING: 0
ABDOMINAL DISTENTION: 0
BACK PAIN: 0
PHOTOPHOBIA: 0
CONSTIPATION: 0

## 2025-01-30 NOTE — PROGRESS NOTES
[facility] [place] [facility]  [facility] CARDIOLOGY  [address]  [address]  [facility] [address]  Dept: [phone]  Dept Fax: [phone]  Loc: [phone]    Visit Date: 1/30/2025    Latesha Strickland is a 64 y.o. female who presents todayfor:  Chief Complaint   Patient presents with    New Patient    Establish Cardiologist    Hypertension   Here for the first time  Major family history of CAD  Some intermittent palpitation   Not frequent   Does have some heart burn   Better with PPI  No chest pain   Some baseline dyspnea  No changes in breathing  Higher BP today   Looks like higher lately   No known CAD  No known hyperlipidemia  No smoking  Family history of CAD        HPI:  Hypertension  Associated symptoms include palpitations and shortness of breath. Pertinent negatives include no chest pain or neck pain.     History reviewed. No pertinent past medical history.   Past Surgical History:   Procedure Laterality Date    BLADDER SUSPENSION  2000    sling- Dr Rice    CARPAL TUNNEL RELEASE  ?????    left and right    HYSTERECTOMY (CERVIX STATUS UNKNOWN)  2000    total    INCISIONAL BREAST BIOPSY Right 1984    right excisional biopsy    KNEE ARTHROSCOPY  2009    knee cap    OVARY REMOVAL Bilateral 2000    ROTATOR CUFF REPAIR Left 2001     Family History   Problem Relation Age of Onset    Heart Disease Mother     Heart Disease Father     Prostate Cancer Father     Diabetes Brother     Heart Attack Brother      Social History     Tobacco Use    Smoking status: Never    Smokeless tobacco: Never   Substance Use Topics    Alcohol use: No      Current Outpatient Medications   Medication Sig Dispense Refill    omeprazole (PRILOSEC OTC) 20 MG tablet Take 1 tablet by mouth daily      Ascorbic Acid (VITAMIN C PO) Take 500 mg by mouth daily.       No current facility-administered medications for this visit.     Allergies   Allergen Reactions    Sulfa Antibiotics      Health Maintenance   Topic Date Due

## 2025-06-05 ENCOUNTER — OFFICE VISIT (OUTPATIENT)
Dept: FAMILY MEDICINE CLINIC | Age: 65
End: 2025-06-05

## 2025-06-05 VITALS
BODY MASS INDEX: 33.04 KG/M2 | DIASTOLIC BLOOD PRESSURE: 98 MMHG | WEIGHT: 186.5 LBS | HEART RATE: 72 BPM | RESPIRATION RATE: 16 BRPM | SYSTOLIC BLOOD PRESSURE: 172 MMHG

## 2025-06-05 DIAGNOSIS — J30.2 SEASONAL ALLERGIES: ICD-10-CM

## 2025-06-05 DIAGNOSIS — H69.93 ETD (EUSTACHIAN TUBE DYSFUNCTION), BILATERAL: ICD-10-CM

## 2025-06-05 DIAGNOSIS — J01.90 ACUTE RHINOSINUSITIS: Primary | ICD-10-CM

## 2025-06-05 RX ORDER — PREDNISONE 20 MG/1
20 TABLET ORAL 2 TIMES DAILY
Qty: 10 TABLET | Refills: 0 | Status: SHIPPED | OUTPATIENT
Start: 2025-06-05 | End: 2025-06-10

## 2025-06-05 SDOH — ECONOMIC STABILITY: FOOD INSECURITY: WITHIN THE PAST 12 MONTHS, THE FOOD YOU BOUGHT JUST DIDN'T LAST AND YOU DIDN'T HAVE MONEY TO GET MORE.: NEVER TRUE

## 2025-06-05 SDOH — ECONOMIC STABILITY: FOOD INSECURITY: WITHIN THE PAST 12 MONTHS, YOU WORRIED THAT YOUR FOOD WOULD RUN OUT BEFORE YOU GOT MONEY TO BUY MORE.: NEVER TRUE

## 2025-06-05 ASSESSMENT — ENCOUNTER SYMPTOMS
SINUS PRESSURE: 1
GASTROINTESTINAL NEGATIVE: 1
FACIAL SWELLING: 0
RHINORRHEA: 1
SINUS PAIN: 1
COUGH: 1

## 2025-06-05 ASSESSMENT — PATIENT HEALTH QUESTIONNAIRE - PHQ9
1. LITTLE INTEREST OR PLEASURE IN DOING THINGS: NOT AT ALL
SUM OF ALL RESPONSES TO PHQ QUESTIONS 1-9: 0
SUM OF ALL RESPONSES TO PHQ QUESTIONS 1-9: 0
2. FEELING DOWN, DEPRESSED OR HOPELESS: NOT AT ALL
SUM OF ALL RESPONSES TO PHQ QUESTIONS 1-9: 0
SUM OF ALL RESPONSES TO PHQ QUESTIONS 1-9: 0

## 2025-06-05 NOTE — PROGRESS NOTES
Latesha Strickland (:  1960) is a 64 y.o. female,Established patient, here for evaluation of the following chief complaint(s):  Ear Pain (Bilateral ear pain off and on since last visit 24, was on a flight prior to the 24 visit and felt like her ears were going to rupture. She has flown before and never had this feeling), Neck Pain (Pt c/o right side neck pain and stiffness the past couple weeks. Thought she slept wrong but symptoms have not gone away), and Cough (Pt c/o an \"annoying\" cough every so often, does not feel sick \"just feel crappy at times\")          Subjective   SUBJECTIVE/OBJECTIVE:  HPI  Chief Complaint   Patient presents with    Ear Pain     Bilateral ear pain off and on since last visit 24, was on a flight prior to the 24 visit and felt like her ears were going to rupture. She has flown before and never had this feeling    Neck Pain     Pt c/o right side neck pain and stiffness the past couple weeks. Thought she slept wrong but symptoms have not gone away    Cough     Pt c/o an \"annoying\" cough every so often, does not feel sick \"just feel crappy at times\"     Pt presents today with several concerns.    Bilateral ear pain that comes and goes since Feb.      Pt also has some pain in the right side of her neck also over the last few weeks.  NKI.  She did have a few days of numbness in the the left hand, 4th and 5th digit.   Denies weakness.    Pt also c/o periodic cough the comes and goes for the last several months.      Pt has not been taking any OTCs.      Patient Active Problem List   Diagnosis    GERD (gastroesophageal reflux disease)    History of hysterectomy for benign disease    Family history of early CAD    Overactive bladder    Mass of postauricular area    Hypertrophy of tonsils    Hemangioma of skin and subcutaneous tissue       Current Outpatient Medications   Medication Sig Dispense Refill    amoxicillin-clavulanate (AUGMENTIN) 875-125 MG per tablet Take 1

## 2025-06-20 ENCOUNTER — TELEPHONE (OUTPATIENT)
Dept: FAMILY MEDICINE CLINIC | Age: 65
End: 2025-06-20

## 2025-06-20 RX ORDER — METHYLPREDNISOLONE 4 MG/1
TABLET ORAL
Qty: 1 KIT | Refills: 0 | Status: SHIPPED | OUTPATIENT
Start: 2025-06-20 | End: 2025-06-26

## 2025-06-20 NOTE — TELEPHONE ENCOUNTER
Patient called in the office. Still having complaints of left ear pain and left neck pain. Wasn't sure if this is just part of the Rhinosinusitis. Has finished up meds that were prescribed and did see a chiropractor on 6/10/25. Please advise.